# Patient Record
Sex: FEMALE | Race: WHITE | NOT HISPANIC OR LATINO | URBAN - METROPOLITAN AREA
[De-identification: names, ages, dates, MRNs, and addresses within clinical notes are randomized per-mention and may not be internally consistent; named-entity substitution may affect disease eponyms.]

---

## 2023-12-19 ENCOUNTER — APPOINTMENT (RX ONLY)
Dept: URBAN - METROPOLITAN AREA CLINIC 148 | Facility: CLINIC | Age: 38
Setting detail: DERMATOLOGY
End: 2023-12-19

## 2023-12-19 DIAGNOSIS — L90.5 SCAR CONDITIONS AND FIBROSIS OF SKIN: ICD-10-CM

## 2023-12-19 PROCEDURE — ? INTRALESIONAL KENALOG

## 2023-12-19 PROCEDURE — ? CONSULTATION FOR SCAR REVISION

## 2023-12-19 PROCEDURE — 11900 INJECT SKIN LESIONS </W 7: CPT

## 2023-12-19 ASSESSMENT — LOCATION ZONE DERM: LOCATION ZONE: VULVA

## 2023-12-19 ASSESSMENT — LOCATION SIMPLE DESCRIPTION DERM: LOCATION SIMPLE: VULVA

## 2023-12-19 ASSESSMENT — LOCATION DETAILED DESCRIPTION DERM: LOCATION DETAILED: LEFT LABIA MINORA

## 2023-12-19 NOTE — PROCEDURE: INTRALESIONAL KENALOG
Kenalog Preparation: Kenalog
Validate Note Data When Using Inventory: Yes
Require Ndc Code?: No
Total Volume (Ccs): 0.8
Detail Level: Detailed
Medical Necessity Clause: This procedure was medically necessary because the lesions that were treated were:
How Many Mls Were Removed From The 80 Mg/Ml (5ml) Vial When Preparing The Injectable Solution?: 0
Concentration Of Kenalog Solution Injected (Mg/Ml): 10.0
Kenalog Type Of Vial: Multiple Dose
Consent: The risks of atrophy were reviewed with the patient.

## 2023-12-19 NOTE — PROCEDURE: CONSULTATION FOR SCAR REVISION
Other Plan: ILK injection - Extensive discussion exchanged, plan is to inject with ILK, patient will follow up and area will be reinfected if symptoms persist. Revision of scar not advised at this t8e. Patient verbalizes understanding and agrees with plan of care.
X Size Of Scar In Cm (Optional): 0
Detail Level: Detailed

## 2023-12-19 NOTE — HPI: BODY LOCATION - VULVA
How Severe Is Your Condition?: moderate
Additional History: Patient reports skin tore during intercourse, healed with scar that causes recurrent discomfort. Patient denies fecal or urinary incontinence.